# Patient Record
(demographics unavailable — no encounter records)

---

## 2024-11-25 NOTE — CONSULT LETTER
[Dear  ___] : Dear  [unfilled], [Consult Letter:] : I had the pleasure of evaluating your patient, [unfilled]. [Please see my note below.] : Please see my note below. [Consult Closing:] : Thank you very much for allowing me to participate in the care of this patient.  If you have any questions, please do not hesitate to contact me. [Sincerely,] : Sincerely, [FreeTextEntry3] : Yudelka Juan MD Pediatric Pulmonology and Sleep Medicine Director [facility] , Pediatric Sleep Disorders,  of Pediatrics, [facility] at [facility], [address] [place] [id number] (P)[phone] (P) [phone] (F) [phone]

## 2024-11-25 NOTE — HISTORY OF PRESENT ILLNESS
[FreeTextEntry1] : This 3-1/2-year-old was seen for a follow-up visit.  I had seen him on 1 occasion in 2024.  He was receiving fluticasone 44, 2 puffs twice daily with a spacer and mask.  He fought administration for a while but at present parents are able to administer this when he is awake.  He often has a runny nose in the mornings.  He had 1 cold that resolved with use of the action plan.  The family has oxygen in the home but this has not been used in over a year. He coughs with activity but was only receiving albuterol when he starts coughing. He drinks 8 ounces of liquid, mostly water or juice and water with a straw.  He eats pureed blended food.  He had a modified rehab barium swallow which showed 1 episode of laryngeal penetration and aspiration.  He had not yet had allergy testing performed.  He was born after 26 weeks gestation by  section.  He was in the  ICU at [facility] from birth till May 2021.  From May till 2021 he was in the children specialized unit.  He was initially on a ventilator for 3 months.  Weaning was attempted but he had to be reintubated.  2021 he had a tracheotomy placed.  He was ventilated for a year.  Tracheotomy tube was removed 2022.    Fall and winter with colds he coughs for 1 to 2 weeks.  Mostly it is a daytime cough.  When he laughs or plays outdoors he clears his throat, coughs and vomits.  With Cools he will sometimes wheeze and develops shortness of breath.   He has a button in place.  He receives PediaSure peptide 2 times a day.  He does not chew.  He eats pureed food.  He drinks with a straw.  He is was diagnosed to have autism spectrum disorder by developmental pediatrics at 2 years of age.  He will receive speech therapy, physical therapy and Occupational Therapy.  He is following up with an allergist .  He had not received steroids in the past year.  At present he does not cough at night.  Sleep: He is not snoring at night.   Parents deny retinopathy of prematurity.

## 2024-11-25 NOTE — REVIEW OF SYSTEMS
[NI] : Allergic [Nl] : Hematologic/Lymphatic [Wheezing] : wheezing [Cough] : cough [Bronchiolitis] : bronchiolitis [Sputum] : sputum [Problems Swallowing] : problems swallowing [Vomiting] : vomiting [Muscle Weakness] : muscle weakness [Developmental Delay] : developmental delay [Short Stature] : short stature was noted [Rhinorrhea] : rhinorrhea [Nasal Congestion] : nasal congestion [Fever] : no fever [Chills] : no chills [Poor Appetite] : no poor appetite [Frequent URIs] : no frequent upper respiratory infections [Snoring] : no snoring [Apnea] : no apnea [Restlessness] : no restlessness [Daytime Sleepiness] : no daytime sleepiness [Daytime Hyperactivity] : no daytime hyperactivity [Voice Changes] : no voice changes [Frequent Croup] : no frequent croup [Chronic Hoarseness] : no chronic hoarseness [Sinus Problems] : no sinus problems [Postnasl Drip] : no postnasal drip [Epistaxis] : no epistaxis [Recurrent Ear Infections] : no recurrent ear infections [Recurrent Sinus Infections] : no recurrent sinus infections [Recurrent Throat Infections] : no recurrent throat infections [Heart Disease] : no heart disease [Edema] : no edema [Diaphoresis] : not diaphoretic [Orthopnea] : no orthopnea [Abnormal Heart Rhythm] : no abnormal heart rhythm [Pulmonary Hypertension] : pulmonary hypertension [Tachypnea] : not tachypneic [Shortness of Breath] : no shortness of breath [Bronchitis] : no bronchitis [Pneumonia] : no pneumonia [Hemoptysis] : no hemoptysis [Chronically Infected with ___] : no chronic infections [Spitting Up] : not spitting up [Diarrhea] : no diarrhea [Foul Smelling Stool] : no foul smelling stool [Oily Stool] : no oily stool [Reflux] : no reflux [Food Intolerance] : food tolerant [Abdomen Distention] : abdomen not distended [Rectal Prolapse] : no rectal prolapse [Urgency] : no feelings of urinary urgency [Dysuria] : no dysuria [Seizure] : no seizures [Brain Hemorrhage] : no brain hemorrhage [Head Injury] : no head injury [Joint Swelling] : no joint swelling [Raynaud's Phenomenon] : no raynaud's phenomenon [Rib Cage Abnormalities] : no rib cage abnormalities [Trauma] : no trauma [Fracture] : no fracture [Clubbing] : no clubbing [Sleep Disturbances] : ~T no sleep disturbances [Hyperactive] : no hyperactive behavior [Failure To Thrive] : no failure to thrive [FreeTextEntry5] : History of PDA [de-identified] : Autism spectrum disorder

## 2024-11-25 NOTE — REVIEW OF SYSTEMS
[NI] : Allergic [Nl] : Hematologic/Lymphatic [Wheezing] : wheezing [Cough] : cough [Bronchiolitis] : bronchiolitis [Sputum] : sputum [Problems Swallowing] : problems swallowing [Vomiting] : vomiting [Muscle Weakness] : muscle weakness [Developmental Delay] : developmental delay [Short Stature] : short stature was noted [Rhinorrhea] : rhinorrhea [Nasal Congestion] : nasal congestion [Fever] : no fever [Chills] : no chills [Poor Appetite] : no poor appetite [Frequent URIs] : no frequent upper respiratory infections [Snoring] : no snoring [Apnea] : no apnea [Restlessness] : no restlessness [Daytime Sleepiness] : no daytime sleepiness [Daytime Hyperactivity] : no daytime hyperactivity [Voice Changes] : no voice changes [Frequent Croup] : no frequent croup [Chronic Hoarseness] : no chronic hoarseness [Sinus Problems] : no sinus problems [Postnasl Drip] : no postnasal drip [Epistaxis] : no epistaxis [Recurrent Ear Infections] : no recurrent ear infections [Recurrent Sinus Infections] : no recurrent sinus infections [Recurrent Throat Infections] : no recurrent throat infections [Heart Disease] : no heart disease [Edema] : no edema [Diaphoresis] : not diaphoretic [Orthopnea] : no orthopnea [Abnormal Heart Rhythm] : no abnormal heart rhythm [Pulmonary Hypertension] : pulmonary hypertension [Tachypnea] : not tachypneic [Shortness of Breath] : no shortness of breath [Bronchitis] : no bronchitis [Pneumonia] : no pneumonia [Hemoptysis] : no hemoptysis [Chronically Infected with ___] : no chronic infections [Spitting Up] : not spitting up [Diarrhea] : no diarrhea [Foul Smelling Stool] : no foul smelling stool [Oily Stool] : no oily stool [Reflux] : no reflux [Food Intolerance] : food tolerant [Abdomen Distention] : abdomen not distended [Rectal Prolapse] : no rectal prolapse [Urgency] : no feelings of urinary urgency [Dysuria] : no dysuria [Seizure] : no seizures [Brain Hemorrhage] : no brain hemorrhage [Head Injury] : no head injury [Joint Swelling] : no joint swelling [Raynaud's Phenomenon] : no raynaud's phenomenon [Rib Cage Abnormalities] : no rib cage abnormalities [Trauma] : no trauma [Fracture] : no fracture [Clubbing] : no clubbing [Sleep Disturbances] : ~T no sleep disturbances [Hyperactive] : no hyperactive behavior [Failure To Thrive] : no failure to thrive [FreeTextEntry5] : History of PDA [de-identified] : Autism spectrum disorder

## 2024-11-25 NOTE — REASON FOR VISIT
[Routine Follow-Up] : a routine follow-up visit for [Asthma/RAD] : asthma/RAD [Parents] : parents [FreeTextEntry3] : Chronic lung disease of prematurity

## 2024-11-25 NOTE — HISTORY OF PRESENT ILLNESS
[FreeTextEntry1] : This 3-1/2-year-old was seen for a follow-up visit.  I had seen him on 1 occasion in 2024.  He was receiving fluticasone 44, 2 puffs twice daily with a spacer and mask.  He fought administration for a while but at present parents are able to administer this when he is awake.  He often has a runny nose in the mornings.  He had 1 cold that resolved with use of the action plan.  The family has oxygen in the home but this has not been used in over a year. He coughs with activity but was only receiving albuterol when he starts coughing. He drinks 8 ounces of liquid, mostly water or juice and water with a straw.  He eats pureed blended food.  He had a modified rehab barium swallow which showed 1 episode of laryngeal penetration and aspiration.  He had not yet had allergy testing performed.  He was born after 26 weeks gestation by  section.  He was in the  ICU at [facility] from birth till May 2021.  From May till 2021 he was in the children specialized unit.  He was initially on a ventilator for 3 months.  Weaning was attempted but he had to be reintubated.  2021 he had a tracheotomy placed.  He was ventilated for a year.  Tracheotomy tube was removed 2022.    Fall and winter with colds he coughs for 1 to 2 weeks.  Mostly it is a daytime cough.  When he laughs or plays outdoors he clears his throat, coughs and vomits.  With Cools he will sometimes wheeze and develops shortness of breath.   He has a button in place.  He receives PediaSure peptide 2 times a day.  He does not chew.  He eats pureed food.  He drinks with a straw.  He is was diagnosed to have autism spectrum disorder by developmental pediatrics at 2 years of age.  He will receive speech therapy, physical therapy and Occupational Therapy.  He is following up with an allergist .  He had not received steroids in the past year.  At present he does not cough at night.  Sleep: He is not snoring at night.   Parents deny retinopathy of prematurity. Declines

## 2024-11-25 NOTE — ASSESSMENT
[FreeTextEntry1] : Impression: Oropharyngeal dysphagia, reactive airways disease, chronic lung disease of prematurity, developmental delay, short stature  Modified rehab barium swallow showed 1 episode of laryngeal penetration and aspiration.  Suggested offering nectar thick feedings.  Reactive airways disease: Fluticasone 44 mcg a puff was prescribed, 2 puffs twice daily with a spacer and mask.    Albuterol is to be administered prior to activity and every 4 hours as needed.  The influenza vaccine was administered.  Benefits and possible side effects discussed. Possible allergic rhinitis: Respiratory allergy panel is to be checked by the ImmunoCAP technique.  Cetirizine is to be administered as needed.  Feeding: Encouraged parents to offer whole milk or PediaSure orally.  Discouraged juice. Chronic lung disease: He has not needed oxygen in the past year on an as-needed basis.  Due to maternal concern, will renew as needed oxygen for 3 months. Over 50% of time was spent in counseling.  I asked parents to bring him back for a follow-up visit in a month's time. Visit took 40 minutes. Dictation generated through [employer] [facility]. Note not proofed and edited.

## 2024-11-25 NOTE — PHYSICAL EXAM
[Alert] : ~L alert [Active] : active [No Allergic Shiners] : no allergic shiners [No Drainage] : no drainage [No Conjunctivitis] : no conjunctivitis [Tympanic Membranes Clear] : tympanic membranes were clear [No Nasal Drainage] : no nasal drainage [No Polyps] : no polyps [No Sinus Tenderness] : no sinus tenderness [No Oral Pallor] : no oral pallor [No Oral Cyanosis] : no oral cyanosis [No Exudates] : no exudates [No Postnasal Drip] : no postnasal drip [Tonsil Size ___] : tonsil size [unfilled] [No Stridor] : no stridor [Absence Of Retractions] : absence of retractions [Symmetric] : symmetric [Good Expansion] : good expansion [No Acc Muscle Use] : no accessory muscle use [Good aeration to bases] : good aeration to bases [Equal Breath Sounds] : equal breath sounds bilaterally [No Crackles] : no crackles [No Rhonchi] : no rhonchi [No Wheezing] : no wheezing [Normal Sinus Rhythm] : normal sinus rhythm [No Heart Murmur] : no heart murmur [Soft, Non-Tender] : soft, non-tender [No Hepatosplenomegaly] : no hepatosplenomegaly [Non Distended] : was not ~L distended [Abdomen Mass (___ Cm)] : no abdominal mass palpated [Abdomen Hernia] : no hernia was discovered [Full ROM] : full range of motion [No Clubbing] : no clubbing [Capillary Refill < 2 secs] : capillary refill less than two seconds [No Cyanosis] : no cyanosis [No Petechiae] : no petechiae [No Kyphoscoliosis] : no kyphoscoliosis [No Contractures] : no contractures [Abnormal Walk] : normal gait [Alert and  Oriented] : alert and oriented [No Abnormal Focal Findings] : no abnormal focal findings [No Birth Marks] : no birth marks [No Rashes] : no rashes [No Skin Ulcers] : no skin ulcers [FreeTextEntry1] : No weight gain since last seen [FreeTextEntry5] : Scar neck [FreeTextEntry9] : Button in place [de-identified] : Tone slightly decreased

## 2024-11-25 NOTE — ASSESSMENT
[FreeTextEntry1] : Impression: Oropharyngeal dysphagia, reactive airways disease, chronic lung disease of prematurity, developmental delay, short stature  Modified rehab barium swallow showed 1 episode of laryngeal penetration and aspiration.  Suggested offering nectar thick feedings.  Reactive airways disease: Fluticasone 44 mcg a puff was prescribed, 2 puffs twice daily with a spacer and mask.    Albuterol is to be administered prior to activity and every 4 hours as needed.  The influenza vaccine was administered.  Benefits and possible side effects discussed. Possible allergic rhinitis: Respiratory allergy panel is to be checked by the ImmunoCAP technique.  Cetirizine is to be administered as needed.  Feeding: Encouraged parents to offer whole milk or PediaSure orally.  Discouraged juice. Chronic lung disease: He has not needed oxygen in the past year on an as-needed basis.  Due to maternal concern, will renew as needed oxygen for 3 months. Over 50% of time was spent in counseling.  I asked parents to bring him back for a follow-up visit in a month's time. Visit took 40 minutes. Dictation generated through [employer] [facility]. Note not proofed and edited.

## 2024-11-25 NOTE — PHYSICAL EXAM
[Alert] : ~L alert [Active] : active [No Allergic Shiners] : no allergic shiners [No Drainage] : no drainage [No Conjunctivitis] : no conjunctivitis [Tympanic Membranes Clear] : tympanic membranes were clear [No Nasal Drainage] : no nasal drainage [No Polyps] : no polyps [No Sinus Tenderness] : no sinus tenderness [No Oral Pallor] : no oral pallor [No Oral Cyanosis] : no oral cyanosis [No Exudates] : no exudates [No Postnasal Drip] : no postnasal drip [Tonsil Size ___] : tonsil size [unfilled] [No Stridor] : no stridor [Absence Of Retractions] : absence of retractions [Symmetric] : symmetric [Good Expansion] : good expansion [No Acc Muscle Use] : no accessory muscle use [Good aeration to bases] : good aeration to bases [Equal Breath Sounds] : equal breath sounds bilaterally [No Crackles] : no crackles [No Rhonchi] : no rhonchi [No Wheezing] : no wheezing [Normal Sinus Rhythm] : normal sinus rhythm [No Heart Murmur] : no heart murmur [Soft, Non-Tender] : soft, non-tender [No Hepatosplenomegaly] : no hepatosplenomegaly [Non Distended] : was not ~L distended [Abdomen Mass (___ Cm)] : no abdominal mass palpated [Abdomen Hernia] : no hernia was discovered [Full ROM] : full range of motion [No Clubbing] : no clubbing [Capillary Refill < 2 secs] : capillary refill less than two seconds [No Cyanosis] : no cyanosis [No Petechiae] : no petechiae [No Kyphoscoliosis] : no kyphoscoliosis [No Contractures] : no contractures [Abnormal Walk] : normal gait [Alert and  Oriented] : alert and oriented [No Abnormal Focal Findings] : no abnormal focal findings [No Birth Marks] : no birth marks [No Rashes] : no rashes [No Skin Ulcers] : no skin ulcers [FreeTextEntry1] : No weight gain since last seen [FreeTextEntry5] : Scar neck [FreeTextEntry9] : Button in place [de-identified] : Tone slightly decreased

## 2024-11-25 NOTE — CONSULT LETTER
[Dear  ___] : Dear  [unfilled], [Consult Letter:] : I had the pleasure of evaluating your patient, [unfilled]. [Please see my note below.] : Please see my note below. [Consult Closing:] : Thank you very much for allowing me to participate in the care of this patient.  If you have any questions, please do not hesitate to contact me. [Sincerely,] : Sincerely, [FreeTextEntry3] : Yudelka Juan MD Pediatric Pulmonology and Sleep Medicine Director Pediatric [facility] , Pediatric Sleep Disorders,  of Pediatrics, [facility] at [facility], [address] [place] [id number] (P)[phone] (P) [phone] (F) [phone]

## 2024-12-17 NOTE — DEVELOPMENTAL MILESTONES
[Plays make-believe] : plays make-believe [Uses 4-word sentences] : uses 4-word sentences [Uses words that are 100%] : uses words that are 100% intelligible to strangers [Climbs stairs, alternating feet] : climbs stairs, alternating feet without support [Skips on one foot] : skips on one foot [Grasps a pencil with thumb and] : grasps a pencil with thumb and fingers instead of fist [Goes to the bathroom and has] : does not go to bathroom and has bowel movement by self [Dresses and undresses without] : does not dress and undress without much help [Tells a story from a book] : does not tell a story from a book [Draws a person with head and] : does not draw a person with head and 3 body part [Draws a simple cross] : does not draw a simple cross [Unbuttons medium-sized buttons] : does not unbutton medium-sized buttons [Draws recognizable pictures] : does not draw recognizable pictures

## 2024-12-17 NOTE — DISCUSSION/SUMMARY
[Normal Growth] : growth [Normal Development] : development  [No Elimination Concerns] : elimination [Continue Regimen] : feeding [No Skin Concerns] : skin [Normal Sleep Pattern] : sleep [None] : no medical problems [Anticipatory Guidance Given] : Anticipatory guidance addressed as per the history of present illness section [No Medications] : ~He/She~ is not on any medications [Mother] : mother [de-identified] : Pediatrics Pulmonology, opthalmology, dental [] : The components of the vaccine(s) to be administered today are listed in the plan of care. The disease(s) for which the vaccine(s) are intended to prevent and the risks have been discussed with the caretaker.  The risks are also included in the appropriate vaccination information statements which have been provided to the patient's caregiver.  The caregiver has given consent to vaccinate. [FreeTextEntry1] :  4 year old M presenting for HCM. Growth normal. Developmental delay. PE unremarkable. Vision screen passed. Immunizations UTD and has received flu vaccine this year.   - Routine care & anticipatory guidance given - Vaccines given: Proquad and Kinrix - Post vaccine care discussed & potential side effects reviewed - Referred to audiology, dental & optometry for routine screens - RTC for 5 year old HCM and prn  Caretaker expressed understanding of the plan and agrees. All questions were answered.

## 2024-12-17 NOTE — REVIEW OF SYSTEMS
[Nasal Discharge] : nasal discharge [Cough] : cough [Headache] : no headache [Eye Pain] : no eye pain [Eye Discharge] : no eye discharge [Eye Redness] : no eye redness [Increased Lacrimation] : no increased lacrimation [Ear Pain] : no ear pain [Nasal Congestion] : nasal congestion [Snoring] : snoring [Mouth Breathing] : no mouth breathing [Dysconjugate gaze] : no dysconjugate gaze [Tachypnea] : not tachypneic [Wheezing] : no wheezing [Vomiting] : no vomiting [Diarrhea] : no diarrhea [Rash] : no rash [Negative] : Genitourinary

## 2024-12-17 NOTE — HISTORY OF PRESENT ILLNESS
[Mother] : mother [whole ___ oz/d] : consumes [unfilled] oz of whole cow's milk per day [Fruit] : fruit [Vegetables] : vegetables [Meat] : meat [Vitamin] : Patient takes vitamin daily [Normal] : Normal [___ stools per day] : [unfilled]  stools per day [___ voids per day] : [unfilled] voids per day [Sippy cup use] : Sippy cup use [Brushing teeth] : Brushing teeth [Yes] : Patient goes to dentist yearly [Toothpaste] : Primary Fluoride Source: Toothpaste [In Pre-K] : In Pre-K [Curiosity about body] : Curiosity about body [Playtime (60 min/d)] : Playtime 60 min a day [< 2 hrs of screen time] : Less than 2 hrs of screen time [Appropiate parent-child communication] : Appropriate parent-child communication [Child given choices] : Child given choices [Child Cooperates] : Child cooperates [Parent has appropriate responses to behavior] : Parent has appropriate responses to behavior [No] : Not at  exposure [Water heater temperature set at <120 degrees F] : Water heater temperature set at <120 degrees F [Car seat in back seat] : Car seat in back seat [Carbon Monoxide Detectors] : Carbon monoxide detectors [Smoke Detectors] : Smoke detectors [Supervised outdoor play] : Supervised outdoor play [Up to date] : Up to date [NO] : No [Exposure to electronic nicotine delivery system] : No exposure to electronic nicotine delivery system [FreeTextEntry7] : Urgent care 1 week ago, RSV+, no hosp admission, 1-2L NC at home, last fever on sun 12/8 [de-identified] : not daily milks + thick purees  [FreeTextEntry3] : Sleeps with parents [FreeTextEntry1] : 5yo M PMHx, 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 No acute concerns this visit.   Interval history remarkable for recent RSV infection approx 2 weeks ago for which he required an Urgent Care visit and needed 1-2 L O2 NC while sleeping. Last fever on 12/8. Mother reports persistent cough in mornings and runny nose. Otherwise symptoms has improved.   Pulm: takes albuterol 20 mins prior to rigorous activity and when sick. Takes flovent daily (2 puffs BID) for maintenance therapy. Last seen by Dr Juan 11/25/24. GI: Follows with Dr Hall in [facility], next f/u in 1 month. Feeding regimen still unchanged and consists of Pediasure peptide 1.5 in addition to pureed foods and juice. ENT:  Seen for snoring in May 2024, laryngoscope done which showed enlarged adenoids, however, no acute intervention required. Recommended sleep study, not done. Snoring has now improved, only snores when sick now. Cardio: Echo normal 1 year ago. No outpatient f/u. Ophto: Last seen in 2021 for retinopathy.

## 2024-12-17 NOTE — ADDENDUM
[FreeTextEntry1] : SDOH Screening Questionnaire  SDOH (Social Determinants of Health) Questionnaire: 1. Housing: Do you worry that in the upcoming months, your family, or child, may not have a safe or stable place to live? no 2. Food security: Within the last 12 months, did the food you bought not last and you did not have money to buy more? no 3. Community: Do you need help getting public benefits like food stamps or WIC? no 4. Transportation: Does your child have chronic medical condition and therefore struggle with transportation to attend medical appointments? no 5. Healthcare Access: Do you need help getting health or dental insurance? no    Result: Negative Screen. No further intervention needed

## 2025-03-25 NOTE — HISTORY OF PRESENT ILLNESS
[de-identified] : form fill out [FreeTextEntry6] : 3 y/o M presenting to the clinic to have form filled out to continue to have a school nurse.   Pt had rhinorrhea and wheezing two days ago before school, nurse gave him his MDI and the wheezing subsided. He then went to school but had to be picked due to NBNB vomiting three times after having lunch. Nurse then switched him to his Nebulizer. Pt has been well since then. No fevers, chills, earache, sore throat, loss of appetite, abdominal pain, or diarrhea. Dad and brother have strep throat and Mom had URI recently. No recent travel.

## 2025-03-25 NOTE — REVIEW OF SYSTEMS
[Nasal Discharge] : nasal discharge [Wheezing] : wheezing [Vomiting] : vomiting [Negative] : Genitourinary

## 2025-03-25 NOTE — PHYSICAL EXAM
[Acute Distress] : no acute distress [Alert] : alert [EOMI] : grossly EOMI [Clear] : right tympanic membrane clear [Pink Nasal Mucosa] : pink nasal mucosa [Erythematous Oropharynx] : nonerythematous oropharynx [Supple] : supple [FROM] : full passive range of motion [Wheezing] : no wheezing [Rales] : no rales [Rhonchi] : rhonchi [Regular Rate and Rhythm] : regular rate and rhythm [Normal S1, S2 audible] : normal S1, S2 audible [Murmur] : no murmur [FreeTextEntry7] : + course breath sounds bilaterally

## 2025-06-19 NOTE — PHYSICAL EXAM
Detail Level: Detailed Size Of Lesion In Cm (Optional): 0 [Alert] : alert [No Acute Distress] : no acute distress [Playful] : playful [Normocephalic] : normocephalic [Conjunctivae with no discharge] : conjunctivae with no discharge [PERRL] : PERRL [EOMI Bilateral] : EOMI bilateral [Auricles Well Formed] : auricles well formed [Clear Tympanic membranes with present light reflex and bony landmarks] : clear tympanic membranes with present light reflex and bony landmarks [No Discharge] : no discharge [Nares Patent] : nares patent [Pink Nasal Mucosa] : pink nasal mucosa [Palate Intact] : palate intact [Uvula Midline] : uvula midline [Nonerythematous Oropharynx] : nonerythematous oropharynx [No Caries] : no caries [Trachea Midline] : trachea midline [Supple, full passive range of motion] : supple, full passive range of motion [No Palpable Masses] : no palpable masses [Symmetric Chest Rise] : symmetric chest rise [Clear to Auscultation Bilaterally] : clear to auscultation bilaterally [Normoactive Precordium] : normoactive precordium [Regular Rate and Rhythm] : regular rate and rhythm [Normal S1, S2 present] : normal S1, S2 present [No Murmurs] : no murmurs [+2 Femoral Pulses] : +2 femoral pulses [Soft] : soft [NonTender] : non tender [Non Distended] : non distended [Normoactive Bowel Sounds] : normoactive bowel sounds [No Hepatomegaly] : no hepatomegaly [No Splenomegaly] : no splenomegaly [Brady 1] : Brady 1 [Central Urethral Opening] : central urethral opening [Testicles Descended Bilaterally] : testicles descended bilaterally [Patent] : patent [Normally Placed] : normally placed [No Abnormal Lymph Nodes Palpated] : no abnormal lymph nodes palpated [Symmetric Buttocks Creases] : symmetric buttocks creases [Symmetric Hip Rotation] : symmetric hip rotation [No Gait Asymmetry] : no gait asymmetry [No pain or deformities with palpation of bone, muscles, joints] : no pain or deformities with palpation of bone, muscles, joints [Normal Muscle Tone] : normal muscle tone [No Spinal Dimple] : no spinal dimple [NoTuft of Hair] : no tuft of hair [Straight] : straight [+2 Patella DTR] : +2 patella DTR [Cranial Nerves Grossly Intact] : cranial nerves grossly intact [No Rash or Lesions] : no rash or lesions